# Patient Record
Sex: MALE | Race: WHITE | NOT HISPANIC OR LATINO | URBAN - METROPOLITAN AREA
[De-identification: names, ages, dates, MRNs, and addresses within clinical notes are randomized per-mention and may not be internally consistent; named-entity substitution may affect disease eponyms.]

---

## 2018-10-14 ENCOUNTER — INPATIENT (INPATIENT)
Facility: HOSPITAL | Age: 73
LOS: 1 days | Discharge: HOME | DRG: 312 | End: 2018-10-16
Attending: HOSPITALIST | Admitting: HOSPITALIST
Payer: MEDICARE

## 2018-10-14 VITALS
WEIGHT: 169.98 LBS | DIASTOLIC BLOOD PRESSURE: 88 MMHG | HEIGHT: 66 IN | SYSTOLIC BLOOD PRESSURE: 152 MMHG | TEMPERATURE: 101 F | OXYGEN SATURATION: 98 % | RESPIRATION RATE: 20 BRPM | HEART RATE: 86 BPM

## 2018-10-14 DIAGNOSIS — R55 SYNCOPE AND COLLAPSE: ICD-10-CM

## 2018-10-14 LAB
ALBUMIN SERPL ELPH-MCNC: 4.4 G/DL — SIGNIFICANT CHANGE UP (ref 3.3–5)
ALP SERPL-CCNC: 75 U/L — SIGNIFICANT CHANGE UP (ref 40–120)
ALT FLD-CCNC: 18 U/L — SIGNIFICANT CHANGE UP (ref 10–45)
ANION GAP SERPL CALC-SCNC: 13 MMOL/L — SIGNIFICANT CHANGE UP (ref 5–17)
APPEARANCE UR: CLEAR — SIGNIFICANT CHANGE UP
APTT BLD: 32.3 SEC — SIGNIFICANT CHANGE UP (ref 27.5–37.4)
AST SERPL-CCNC: 20 U/L — SIGNIFICANT CHANGE UP (ref 10–40)
BACTERIA # UR AUTO: NEGATIVE — SIGNIFICANT CHANGE UP
BASOPHILS # BLD AUTO: 0 K/UL — SIGNIFICANT CHANGE UP (ref 0–0.2)
BASOPHILS NFR BLD AUTO: 0 % — SIGNIFICANT CHANGE UP (ref 0–2)
BILIRUB SERPL-MCNC: 0.4 MG/DL — SIGNIFICANT CHANGE UP (ref 0.2–1.2)
BILIRUB UR-MCNC: NEGATIVE — SIGNIFICANT CHANGE UP
BUN SERPL-MCNC: 20 MG/DL — SIGNIFICANT CHANGE UP (ref 7–23)
CALCIUM SERPL-MCNC: 9.8 MG/DL — SIGNIFICANT CHANGE UP (ref 8.4–10.5)
CHLORIDE SERPL-SCNC: 101 MMOL/L — SIGNIFICANT CHANGE UP (ref 96–108)
CO2 SERPL-SCNC: 23 MMOL/L — SIGNIFICANT CHANGE UP (ref 22–31)
COLOR SPEC: COLORLESS — SIGNIFICANT CHANGE UP
CREAT SERPL-MCNC: 1.68 MG/DL — HIGH (ref 0.5–1.3)
DIFF PNL FLD: NEGATIVE — SIGNIFICANT CHANGE UP
EOSINOPHIL # BLD AUTO: 0.1 K/UL — SIGNIFICANT CHANGE UP (ref 0–0.5)
EOSINOPHIL NFR BLD AUTO: 1.2 % — SIGNIFICANT CHANGE UP (ref 0–6)
EPI CELLS # UR: 1 /HPF — SIGNIFICANT CHANGE UP
GAS PNL BLDV: SIGNIFICANT CHANGE UP
GLUCOSE SERPL-MCNC: 103 MG/DL — HIGH (ref 70–99)
GLUCOSE UR QL: NEGATIVE — SIGNIFICANT CHANGE UP
HCT VFR BLD CALC: 43.8 % — SIGNIFICANT CHANGE UP (ref 39–50)
HGB BLD-MCNC: 14.7 G/DL — SIGNIFICANT CHANGE UP (ref 13–17)
HYALINE CASTS # UR AUTO: 0 /LPF — SIGNIFICANT CHANGE UP (ref 0–2)
INR BLD: 0.98 RATIO — SIGNIFICANT CHANGE UP (ref 0.88–1.16)
KETONES UR-MCNC: NEGATIVE — SIGNIFICANT CHANGE UP
LACTATE BLDV-MCNC: 1 MMOL/L — SIGNIFICANT CHANGE UP (ref 0.7–2)
LEUKOCYTE ESTERASE UR-ACNC: NEGATIVE — SIGNIFICANT CHANGE UP
LYMPHOCYTES # BLD AUTO: 0.4 K/UL — LOW (ref 1–3.3)
LYMPHOCYTES # BLD AUTO: 5.8 % — LOW (ref 13–44)
MCHC RBC-ENTMCNC: 31.3 PG — SIGNIFICANT CHANGE UP (ref 27–34)
MCHC RBC-ENTMCNC: 33.6 GM/DL — SIGNIFICANT CHANGE UP (ref 32–36)
MCV RBC AUTO: 93.3 FL — SIGNIFICANT CHANGE UP (ref 80–100)
MONOCYTES # BLD AUTO: 0.8 K/UL — SIGNIFICANT CHANGE UP (ref 0–0.9)
MONOCYTES NFR BLD AUTO: 11.4 % — SIGNIFICANT CHANGE UP (ref 2–14)
NEUTROPHILS # BLD AUTO: 5.6 K/UL — SIGNIFICANT CHANGE UP (ref 1.8–7.4)
NEUTROPHILS NFR BLD AUTO: 81.6 % — HIGH (ref 43–77)
NITRITE UR-MCNC: NEGATIVE — SIGNIFICANT CHANGE UP
PH UR: 6.5 — SIGNIFICANT CHANGE UP (ref 5–8)
PLATELET # BLD AUTO: 196 K/UL — SIGNIFICANT CHANGE UP (ref 150–400)
POTASSIUM SERPL-MCNC: 4.3 MMOL/L — SIGNIFICANT CHANGE UP (ref 3.5–5.3)
POTASSIUM SERPL-SCNC: 4.3 MMOL/L — SIGNIFICANT CHANGE UP (ref 3.5–5.3)
PROT SERPL-MCNC: 7.1 G/DL — SIGNIFICANT CHANGE UP (ref 6–8.3)
PROT UR-MCNC: SIGNIFICANT CHANGE UP
PROTHROM AB SERPL-ACNC: 10.7 SEC — SIGNIFICANT CHANGE UP (ref 9.8–12.7)
RAPID RVP RESULT: DETECTED
RBC # BLD: 4.69 M/UL — SIGNIFICANT CHANGE UP (ref 4.2–5.8)
RBC # FLD: 12.3 % — SIGNIFICANT CHANGE UP (ref 10.3–14.5)
RBC CASTS # UR COMP ASSIST: 1 /HPF — SIGNIFICANT CHANGE UP (ref 0–4)
RV+EV RNA SPEC QL NAA+PROBE: DETECTED
SODIUM SERPL-SCNC: 137 MMOL/L — SIGNIFICANT CHANGE UP (ref 135–145)
SP GR SPEC: 1.01 — LOW (ref 1.01–1.02)
UROBILINOGEN FLD QL: NEGATIVE — SIGNIFICANT CHANGE UP
WBC # BLD: 6.8 K/UL — SIGNIFICANT CHANGE UP (ref 3.8–10.5)
WBC # FLD AUTO: 6.8 K/UL — SIGNIFICANT CHANGE UP (ref 3.8–10.5)
WBC UR QL: 2 /HPF — SIGNIFICANT CHANGE UP (ref 0–5)

## 2018-10-14 PROCEDURE — 70450 CT HEAD/BRAIN W/O DYE: CPT | Mod: 26

## 2018-10-14 PROCEDURE — 93010 ELECTROCARDIOGRAM REPORT: CPT

## 2018-10-14 PROCEDURE — 71046 X-RAY EXAM CHEST 2 VIEWS: CPT | Mod: 26

## 2018-10-14 PROCEDURE — 72125 CT NECK SPINE W/O DYE: CPT | Mod: 26

## 2018-10-14 PROCEDURE — 99285 EMERGENCY DEPT VISIT HI MDM: CPT | Mod: GC,25

## 2018-10-14 RX ORDER — ACETAMINOPHEN 500 MG
975 TABLET ORAL ONCE
Qty: 0 | Refills: 0 | Status: COMPLETED | OUTPATIENT
Start: 2018-10-14 | End: 2018-10-14

## 2018-10-14 RX ORDER — SODIUM CHLORIDE 9 MG/ML
1000 INJECTION INTRAMUSCULAR; INTRAVENOUS; SUBCUTANEOUS ONCE
Qty: 0 | Refills: 0 | Status: COMPLETED | OUTPATIENT
Start: 2018-10-14 | End: 2018-10-14

## 2018-10-14 RX ORDER — CEFTRIAXONE 500 MG/1
1 INJECTION, POWDER, FOR SOLUTION INTRAMUSCULAR; INTRAVENOUS ONCE
Qty: 0 | Refills: 0 | Status: COMPLETED | OUTPATIENT
Start: 2018-10-14 | End: 2018-10-14

## 2018-10-14 RX ADMIN — Medication 975 MILLIGRAM(S): at 20:50

## 2018-10-14 RX ADMIN — CEFTRIAXONE 100 GRAM(S): 500 INJECTION, POWDER, FOR SOLUTION INTRAMUSCULAR; INTRAVENOUS at 21:28

## 2018-10-14 RX ADMIN — SODIUM CHLORIDE 1000 MILLILITER(S): 9 INJECTION INTRAMUSCULAR; INTRAVENOUS; SUBCUTANEOUS at 20:24

## 2018-10-14 RX ADMIN — CEFTRIAXONE 1 GRAM(S): 500 INJECTION, POWDER, FOR SOLUTION INTRAMUSCULAR; INTRAVENOUS at 23:00

## 2018-10-14 RX ADMIN — SODIUM CHLORIDE 1000 MILLILITER(S): 9 INJECTION INTRAMUSCULAR; INTRAVENOUS; SUBCUTANEOUS at 21:41

## 2018-10-14 RX ADMIN — SODIUM CHLORIDE 1000 MILLILITER(S): 9 INJECTION INTRAMUSCULAR; INTRAVENOUS; SUBCUTANEOUS at 23:00

## 2018-10-14 RX ADMIN — Medication 975 MILLIGRAM(S): at 23:00

## 2018-10-14 NOTE — ED ADULT NURSE NOTE - CHPI ED NUR SYMPTOMS NEG
no numbness/no tingling/no weakness/no confusion/no fever/no bleeding/no deformity/no vomiting/no abrasion

## 2018-10-14 NOTE — ED ADULT NURSE NOTE - NSIMPLEMENTINTERV_GEN_ALL_ED
Implemented All Fall Risk Interventions:  [place] to call system. Call bell, personal items and telephone within reach. Instruct patient to call for assistance. Room bathroom lighting operational. Non-slip footwear when patient is off stretcher. Physically safe environment: no spills, clutter or unnecessary equipment. Stretcher in lowest position, wheels locked, appropriate side rails in place. Provide visual cue, wrist band, yellow gown, etc. Monitor gait and stability. Monitor for mental status changes and reorient to person, place, and time. Review medications for side effects contributing to fall risk. Reinforce activity limits and safety measures with patient and family.

## 2018-10-14 NOTE — ED PROVIDER NOTE - OBJECTIVE STATEMENT
74 yo M pmh htn presents s/p fall. Pt reports he fell at 5pm, was walking to the restroom, does not remember anything else. +head injury, +loc. Was on the ground between 15-30 minutes before family got to him. Pt reports he was too weak to get up. Endorses weakness and cough 1-2 days duration. Family members report he had an episode of urinary incontinence when he fell. Denies any other injuries. Denies any prodrome before the fall including cp, sob, headache, dizziness. No recent falls. Reports he is back to baseline now, has ambulated since the fall.

## 2018-10-14 NOTE — ED PROVIDER NOTE - PROGRESS NOTE DETAILS
Pt with orthostatic symptoms when getting up and going to the bathroom despite having been given antipyretics and fluids. Likely unsafe for DC. will admit.

## 2018-10-14 NOTE — ED PROVIDER NOTE - ATTENDING CONTRIBUTION TO CARE
ATTENDING MD:  Andrews HODGE, personally have seen and examined this patient.  I have discussed all aspects of care with the resident physician. Resident note reviewed and agree on plan of care and except where noted.  See HPI, PE, and MDM for details.    GEN: alert, nontoxic, A & O x 4  HEAD/EYES: NCAT, PERRL, EOMI, anicteric sclerae, no conjunctival pallor  ENT: mucus membranes moist, oropharynx WNL, trachea midline, no JVD  RESP: lungs CTA with equal breath sounds bilaterally, chest wall nontender and atraumatic  CV: heart with reg rhythm S1, S2, no murmur; distal pulses intact and symmetric bilaterally  GI: normoactive bowel sounds. the abdomen is soft, nondistended, nontender, there are no palpable masses  : no CVAT  MSK: extremities atraumatic and nontender, no edema, no asymmetry. the back is without midline or lateral tenderness, there is no spinal deformity or stepoff and the back is ranged painlessly. the neck has no midline tenderness, deformity, or stepoff, and is ranged painlessly.  SKIN: warm, dry, no rash, no bruising, no cyanosis. color appropriate for ethnicity  NEURO: alert, mentating appropriately, no facial asymmetry. gross sensation, motor, coordination are intact  PSYCH: Affect appropriate    MDM: febrile, no clear source, suspect viral syndrome. syncopal episode likely orthostatic/vasovagoal in nature but having recurrent symptoms with micturation after sluid hydration, likely will continue in setting of fever as pt not responding to treatment, unsafe discharge at this time, will admit.

## 2018-10-14 NOTE — ED ADULT TRIAGE NOTE - CHIEF COMPLAINT QUOTE
slipped on wood floor hit head pt alert on arrival states mild head pain and states legs went out from him recently not feeling well had sinus infection

## 2018-10-14 NOTE — ED PROVIDER NOTE - MEDICAL DECISION MAKING DETAILS
Syncope work-up, infectious work up as pt is febrile here with respiratory complaints. Neuro intact, non focal exam, well appearing. Basic labs, ekg, ct head/cspine, reassess  Sarah Chauhan, PGY-2 EM

## 2018-10-14 NOTE — ED ADULT NURSE NOTE - OBJECTIVE STATEMENT
jpt has syncopal episode unknown how long down few minutes pt was jammed behind the door at home unable to get himself up Right away Pt has had some kendrick al congestion past few days hx of hypertension only IVL placed and fs 108 bloods drawn ans pending md for evaluation Goran jpt has syncopal episode unknown how long down few minutes pt was jammed behind the door at home unable to get himself up Right away Pt has had some kendrick al congestion past few days hx of hypertension only IVL placed and fs 108 bloods drawn ans pending md for evaluation  Wife stated he loss control of his bladder also  Mpuleorn

## 2018-10-15 DIAGNOSIS — Z29.9 ENCOUNTER FOR PROPHYLACTIC MEASURES, UNSPECIFIED: ICD-10-CM

## 2018-10-15 DIAGNOSIS — B34.8 OTHER VIRAL INFECTIONS OF UNSPECIFIED SITE: ICD-10-CM

## 2018-10-15 DIAGNOSIS — Z79.899 OTHER LONG TERM (CURRENT) DRUG THERAPY: ICD-10-CM

## 2018-10-15 DIAGNOSIS — R55 SYNCOPE AND COLLAPSE: ICD-10-CM

## 2018-10-15 DIAGNOSIS — M19.90 UNSPECIFIED OSTEOARTHRITIS, UNSPECIFIED SITE: ICD-10-CM

## 2018-10-15 DIAGNOSIS — N17.9 ACUTE KIDNEY FAILURE, UNSPECIFIED: ICD-10-CM

## 2018-10-15 DIAGNOSIS — I10 ESSENTIAL (PRIMARY) HYPERTENSION: ICD-10-CM

## 2018-10-15 DIAGNOSIS — G93.9 DISORDER OF BRAIN, UNSPECIFIED: ICD-10-CM

## 2018-10-15 DIAGNOSIS — D64.9 ANEMIA, UNSPECIFIED: ICD-10-CM

## 2018-10-15 DIAGNOSIS — Z90.79 ACQUIRED ABSENCE OF OTHER GENITAL ORGAN(S): Chronic | ICD-10-CM

## 2018-10-15 DIAGNOSIS — N40.0 BENIGN PROSTATIC HYPERPLASIA WITHOUT LOWER URINARY TRACT SYMPTOMS: ICD-10-CM

## 2018-10-15 DIAGNOSIS — R79.89 OTHER SPECIFIED ABNORMAL FINDINGS OF BLOOD CHEMISTRY: ICD-10-CM

## 2018-10-15 LAB
ANION GAP SERPL CALC-SCNC: 10 MMOL/L — SIGNIFICANT CHANGE UP (ref 5–17)
BASOPHILS # BLD AUTO: 0 K/UL — SIGNIFICANT CHANGE UP (ref 0–0.2)
BASOPHILS NFR BLD AUTO: 0 % — SIGNIFICANT CHANGE UP (ref 0–2)
BUN SERPL-MCNC: 15 MG/DL — SIGNIFICANT CHANGE UP (ref 7–23)
CALCIUM SERPL-MCNC: 9 MG/DL — SIGNIFICANT CHANGE UP (ref 8.4–10.5)
CHLORIDE SERPL-SCNC: 106 MMOL/L — SIGNIFICANT CHANGE UP (ref 96–108)
CO2 SERPL-SCNC: 23 MMOL/L — SIGNIFICANT CHANGE UP (ref 22–31)
CREAT SERPL-MCNC: 1.68 MG/DL — HIGH (ref 0.5–1.3)
EOSINOPHIL # BLD AUTO: 0 K/UL — SIGNIFICANT CHANGE UP (ref 0–0.5)
EOSINOPHIL NFR BLD AUTO: 0 % — SIGNIFICANT CHANGE UP (ref 0–6)
GLUCOSE SERPL-MCNC: 108 MG/DL — HIGH (ref 70–99)
HCT VFR BLD CALC: 38.2 % — LOW (ref 39–50)
HGB BLD-MCNC: 12.6 G/DL — LOW (ref 13–17)
LACTATE BLDV-MCNC: 1.6 MMOL/L — SIGNIFICANT CHANGE UP (ref 0.7–2)
LYMPHOCYTES # BLD AUTO: 0.79 K/UL — LOW (ref 1–3.3)
LYMPHOCYTES # BLD AUTO: 14.9 % — SIGNIFICANT CHANGE UP (ref 13–44)
MAGNESIUM SERPL-MCNC: 2.1 MG/DL — SIGNIFICANT CHANGE UP (ref 1.6–2.6)
MCHC RBC-ENTMCNC: 31 PG — SIGNIFICANT CHANGE UP (ref 27–34)
MCHC RBC-ENTMCNC: 33 GM/DL — SIGNIFICANT CHANGE UP (ref 32–36)
MCV RBC AUTO: 94.1 FL — SIGNIFICANT CHANGE UP (ref 80–100)
MONOCYTES # BLD AUTO: 0.46 K/UL — SIGNIFICANT CHANGE UP (ref 0–0.9)
MONOCYTES NFR BLD AUTO: 8.8 % — SIGNIFICANT CHANGE UP (ref 2–14)
NEUTROPHILS # BLD AUTO: 4.03 K/UL — SIGNIFICANT CHANGE UP (ref 1.8–7.4)
NEUTROPHILS NFR BLD AUTO: 76.3 % — SIGNIFICANT CHANGE UP (ref 43–77)
PHOSPHATE SERPL-MCNC: 2.4 MG/DL — LOW (ref 2.5–4.5)
PLATELET # BLD AUTO: 167 K/UL — SIGNIFICANT CHANGE UP (ref 150–400)
POTASSIUM SERPL-MCNC: 4 MMOL/L — SIGNIFICANT CHANGE UP (ref 3.5–5.3)
POTASSIUM SERPL-SCNC: 4 MMOL/L — SIGNIFICANT CHANGE UP (ref 3.5–5.3)
RBC # BLD: 4.06 M/UL — LOW (ref 4.2–5.8)
RBC # FLD: 13.3 % — SIGNIFICANT CHANGE UP (ref 10.3–14.5)
SODIUM SERPL-SCNC: 139 MMOL/L — SIGNIFICANT CHANGE UP (ref 135–145)
WBC # BLD: 5.28 K/UL — SIGNIFICANT CHANGE UP (ref 3.8–10.5)
WBC # FLD AUTO: 5.28 K/UL — SIGNIFICANT CHANGE UP (ref 3.8–10.5)

## 2018-10-15 PROCEDURE — 93010 ELECTROCARDIOGRAM REPORT: CPT

## 2018-10-15 PROCEDURE — 12345: CPT | Mod: NC

## 2018-10-15 PROCEDURE — 70553 MRI BRAIN STEM W/O & W/DYE: CPT | Mod: 26

## 2018-10-15 PROCEDURE — 99223 1ST HOSP IP/OBS HIGH 75: CPT | Mod: AI,GC

## 2018-10-15 RX ORDER — ACETAMINOPHEN 500 MG
650 TABLET ORAL EVERY 6 HOURS
Qty: 0 | Refills: 0 | Status: DISCONTINUED | OUTPATIENT
Start: 2018-10-15 | End: 2018-10-16

## 2018-10-15 RX ORDER — SODIUM CHLORIDE 9 MG/ML
1000 INJECTION INTRAMUSCULAR; INTRAVENOUS; SUBCUTANEOUS
Qty: 0 | Refills: 0 | Status: DISCONTINUED | OUTPATIENT
Start: 2018-10-15 | End: 2018-10-16

## 2018-10-15 RX ORDER — ENOXAPARIN SODIUM 100 MG/ML
40 INJECTION SUBCUTANEOUS EVERY 24 HOURS
Qty: 0 | Refills: 0 | Status: DISCONTINUED | OUTPATIENT
Start: 2018-10-15 | End: 2018-10-16

## 2018-10-15 RX ORDER — TRAMADOL HYDROCHLORIDE 50 MG/1
50 TABLET ORAL EVERY 6 HOURS
Qty: 0 | Refills: 0 | Status: DISCONTINUED | OUTPATIENT
Start: 2018-10-15 | End: 2018-10-16

## 2018-10-15 RX ORDER — TRAMADOL HYDROCHLORIDE 50 MG/1
2 TABLET ORAL
Qty: 0 | Refills: 0 | COMMUNITY

## 2018-10-15 RX ORDER — AMLODIPINE BESYLATE 2.5 MG/1
1 TABLET ORAL
Qty: 0 | Refills: 0 | COMMUNITY

## 2018-10-15 RX ORDER — AMLODIPINE BESYLATE 2.5 MG/1
10 TABLET ORAL DAILY
Qty: 0 | Refills: 0 | Status: DISCONTINUED | OUTPATIENT
Start: 2018-10-15 | End: 2018-10-16

## 2018-10-15 RX ADMIN — SODIUM CHLORIDE 75 MILLILITER(S): 9 INJECTION INTRAMUSCULAR; INTRAVENOUS; SUBCUTANEOUS at 10:23

## 2018-10-15 RX ADMIN — TRAMADOL HYDROCHLORIDE 50 MILLIGRAM(S): 50 TABLET ORAL at 11:26

## 2018-10-15 RX ADMIN — ENOXAPARIN SODIUM 40 MILLIGRAM(S): 100 INJECTION SUBCUTANEOUS at 11:04

## 2018-10-15 RX ADMIN — AMLODIPINE BESYLATE 10 MILLIGRAM(S): 2.5 TABLET ORAL at 05:38

## 2018-10-15 RX ADMIN — TRAMADOL HYDROCHLORIDE 50 MILLIGRAM(S): 50 TABLET ORAL at 11:40

## 2018-10-15 NOTE — ED ADULT NURSE REASSESSMENT NOTE - NS ED NURSE REASSESS COMMENT FT1
Patient resting in stretcher and updated on plan of care.  Patient denies; chest pain, shortness of breath, dizziness.  Bed in lowest position.

## 2018-10-15 NOTE — H&P ADULT - PROBLEM SELECTOR PLAN 1
- Syncope after attempting to stand up from bed with LOC and urinary incontinence with no prodromal symptoms  - Differential include: orthostatic hypotension vs cardiac vs vasovagal vs neurologic  - For orthostatic hypotension, possible in the setting of dehydration from viral infection, although orthostatics performed in the ED does not reveal >20 mmHg drop in SBP from sitting to standing position  - For cardiac, patient with normal EKG, no history of cardiac disease, will keep on telemetry for now  - For vasovagal, less likely as patient has no prodromes and syncope not associated with specific activity   - For seizure, no reported postictal state, no tongue biting  - Continue with telemetry monitoring for now, continue with IVF for hydration - Syncope after attempting to stand up from bed with LOC and urinary incontinence with no prodromal symptoms  - Differential include: orthostatic hypotension vs cardiac vs vasovagal vs neurologic  - For orthostatic hypotension, possible in the setting of dehydration from viral infection, although orthostatics performed in the ED does not reveal >20 mmHg drop in SBP from sitting to standing position, but HR increased >15  - For cardiac, patient with normal EKG, no history of cardiac disease, will monitor for arrhythmia on telemetry and will check TTE to eval for structural heart dz  - For vasovagal, less likely as patient has no prodromes and syncope not associated with specific activity   - For seizure, no reported postictal state, no tongue biting, but CT head with 7mm lesion, will check MRI to further eval

## 2018-10-15 NOTE — ED ADULT NURSE REASSESSMENT NOTE - NS ED NURSE REASSESS COMMENT FT1
Patient resting comfortably at this time, awaiting bed assignment on tele unit. Denies c/o pain or discomfort. Comfort and safety measures maintained.

## 2018-10-15 NOTE — PROGRESS NOTE ADULT - PROBLEM SELECTOR PLAN 9
- DVT prophylaxis: IMPROVE score 1 (age>60), Lovenox  - GI prophylaxis: not indicated  - Diet: DASH  - Dispo: PT pending

## 2018-10-15 NOTE — DISCHARGE NOTE ADULT - PROVIDER TOKENS
FREE:[LAST:[Mathews],FIRST:[Tima],PHONE:[(   )    -],FAX:[(   )    -],ADDRESS:[Primary Care Provider in [place]]]

## 2018-10-15 NOTE — H&P ADULT - HISTORY OF PRESENT ILLNESS
History is obtained from the patient, no family at the bedside for corroboration of the story, no answer at phone number listed in the chart.     Patient is a 73 year old man with history of HTN, ostearthritis of back, BPH s/p TURP presented with syncope. He reports that he was trying to get up from bed to the go the bathroom this afternoon and fell. He had loss of consciousness for about 10-15 minutes before family found him and brought him to the ED. He did have urinary incontinence, no jerking movements, no tongue biting. Patient denies chest pain, or palpitations. He denies vertigo. He does endorse feeling week for the past 2-3 days. He has been having a cough, nasal congestions, sore throat for the past 2-3 days. Denies sick contact, but him and his wife are arrived from [place] via train, currently visiting their daughter. While trying to collect urine samples in the bathroom in the ED, patient had another episode of fall, stating that he tripped, no loss of consciousness this time and his wife was on his side to hold him. He denies abdominal pain, nausea or vomiting, diarrhea or constipation, does report feeling dehydrated. He denies past syncopal episodes. He does endorse subjective fever and chills.     In the ED, initial vitals were Temp 101.5, /88, HR 86, RR 20, O2 saturation 98% on room air. He was given Tylenol 975 mg, 1x Ceftriaxone, 2 L NS bolus in the ED. History is obtained from the patient, no family at the bedside for corroboration of the story, nobody is answering at home phone number.     Patient is a 73 year old man with history of HTN, ostearthritis of back, BPH s/p TURP presented with syncope. He reports that he was trying to get up from bed to the go the bathroom this afternoon and fell. He had loss of consciousness for about 5 minutes before family found him and brought him to the ED. He did have urinary incontinence, no jerking movements, no tongue biting. Patient denies chest pain, or palpitations. He denies vertigo. He does endorse feeling week for the past 2-3 days. He has been having a cough, nasal congestions, sore throat for the past 2-3 days. Denies sick contact, but him and his wife arrived from [place] via train, currently visiting their daughter. While trying to collect urine sample in the bathroom in the ED, patient had another witnessed episode of fall, stating that he tripped, no loss of consciousness this time and his wife was on his side to hold him. He denies abdominal pain, nausea or vomiting, diarrhea or constipation, does report feeling dehydrated. He denies past syncopal episodes. He does endorse subjective fever and chills.     In the ED, initial vitals were Temp 101.5, /88, HR 86, RR 20, O2 saturation 98% on room air. He was given Tylenol 975 mg, 1x Ceftriaxone, 2 L NS bolus in the ED. History is obtained from the patient, no family at the bedside for corroboration of the story, nobody is answering at home phone number.     Patient is a 73 year old man with history of HTN, ostearthritis of back, BPH s/p TURP presented with syncope. He reports that he was trying to get up from bed to the go the bathroom this afternoon and fell. He had loss of consciousness for about 5 minutes before family found him and brought him to the ED. He did have urinary incontinence, no jerking movements, no tongue biting. Patient denies chest pain, or palpitations. He denies vertigo. He does endorse feeling week for the past 2-3 days. He has been having a nonproductive cough, nasal congestions, sore throat for the past 2-3 days. Denies sick contact, but him and his wife arrived from [place] via train, currently visiting their daughter. While trying to collect urine sample in the bathroom in the ED, patient had another witnessed episode of fall, stating that he tripped, no loss of consciousness this time and his wife was on his side to hold him. He denies abdominal pain, nausea or vomiting, diarrhea or constipation, does report feeling dehydrated. He denies past syncopal episodes. He does endorse subjective fever and chills.     In the ED, initial vitals were Temp 101.5, /88, HR 86, RR 20, O2 saturation 98% on room air. He was given Tylenol 975 mg, 1x Ceftriaxone, 2 L NS bolus in the ED. History is obtained from the patient, no family at the bedside for corroboration of the story, nobody is answering at home phone number.     Patient is a 73 year old man with history of HTN, osteoarthritis of back, BPH s/p TURP presented with syncope. He reports that he was trying to get up from bed to the go the bathroom this afternoon and fell. He had loss of consciousness for about 5 minutes before family found him and brought him to the ED. He did have urinary incontinence, no jerking movements, no tongue biting. Patient denies chest pain, or palpitations. He denies vertigo. He does endorse feeling week for the past 2-3 days. He has been having a nonproductive cough, nasal congestions, sore throat for the past 2-3 days. Denies sick contact, but him and his wife arrived from [place] via train, currently visiting their daughter. While trying to collect urine sample in the bathroom in the ED, patient had another witnessed episode of fall, stating that he tripped, no loss of consciousness this time and his wife was on his side to hold him. He denies abdominal pain, nausea or vomiting, diarrhea or constipation, does report feeling dehydrated. He denies past syncopal episodes. He does endorse subjective fever and chills.     In the ED, initial vitals were Temp 101.5, /88, HR 86, RR 20, O2 saturation 98% on room air. He was given Tylenol 975 mg, 1x Ceftriaxone, 2 L NS bolus in the ED.

## 2018-10-15 NOTE — PHYSICAL THERAPY INITIAL EVALUATION ADULT - ADDITIONAL COMMENTS
Pt lives at home w/ wife. +4 steps to enter and 1 flt to bedroom. PTA indep w/ all ADLs w/o an assist device.

## 2018-10-15 NOTE — PROGRESS NOTE ADULT - ASSESSMENT
Mr. Prabhakar is an entirely independent 74 yo man from [facility] w/ hx HTN, OA of back, BPH s/p TURP presented with syncope, URI and fever admitted for syncope evaluation, dehydration and entero/rhinovirus infection.     Syncope occurred w/ virus c/b decreased PO intake x many days, +alcohol and fiorocet w/ cumulative diuretic effect, and in context of laying down to standing up per patient report: this is entirely c/w dehydration + orthostatic hypotension in setting of illness. No CP, non ischemic ekg, neg trop on admission w/ no hx of preceding palpitaitons and no cardiac hx, no murmur or clinical e/o HF: there is no indication for inpatient echo at this time per ACC/AHA 2017 guidelines on evaluation of patients w/ syncope. Will monitor x 24 hrs on telemetry.     Discussed CNS lesion w/ Dr. Sen from neuroradiology, GFR 40 ok for contrast to evaluate likely cavernoma.   Set for today.   Hold on echo as above.   Hydration.  Monitor on tele x 24 hrs.    Repeat orthostatics.  Likely d/c early tmrw if no events on tele.

## 2018-10-15 NOTE — H&P ADULT - PROBLEM SELECTOR PLAN 4
BP elevated at 150-160/70-90  - Patient does not remember his home medications   - Continue with Amlodipine 10 mg with holding parameters for now - Lactate elevated at 2.5, could be in the setting of viral infection  - Continue to trend lactate after IVF given

## 2018-10-15 NOTE — DISCHARGE NOTE ADULT - PATIENT PORTAL LINK FT
You can access the [facility] Patient Portal, offered by [facility], by registering with the following website: [email or web address]

## 2018-10-15 NOTE — PHYSICAL THERAPY INITIAL EVALUATION ADULT - PERTINENT HX OF CURRENT PROBLEM, REHAB EVAL
73 year old man with history of HTN, osteoarthritis of back, BPH s/p TURP presented with syncope. He had loss of consciousness for about 5 minutes before family found him and brought him to the ED.; CTH = Incidental note of a 7 mm hyperdense focus in the left felicity. Pt a/w syncope, URI and fever admitted for syncope evaluation, dehydration and entero/rhinovirus infection.

## 2018-10-15 NOTE — H&P ADULT - NSHPREVIEWOFSYSTEMS_GEN_ALL_CORE
Constitutional: Positive for fever and chills. Actively trying to lose weight (8 pounds in the past month)  HEENT: No dry eyes or eye irritation. Nasal congestion.   CV: No chest pain, or palpitations. No orthopnea.   Resp: Cough without sputum production. No shortness of breath or dyspnea on exertion.   GI: No nausea or vomiting. No diarrhea or constipation. No abdominal pain.   : No dysuria, no nocturia or increased urinary frequency.  Musculoskeletal: Chronic back pain, no myalgias  Skin: No rash or itchiness.   Neurological: No headache or dizziness. Syncope, generalized weakness.   Psychiatric: Denies depressed mood.

## 2018-10-15 NOTE — H&P ADULT - PROBLEM SELECTOR PLAN 9
- DVT prophylaxis: IMPROVE score 1 (age>60), Lovenox  - GI prophylaxis: not indicated  - Diet: NICO Booker PGY-2  Internal medicine night admit  Pager [phone] - DVT prophylaxis: IMPROVE score 1 (age>60), Lovenox  - GI prophylaxis: not indicated  - Diet: DASH  - Dispo: PT pending    Irasema Booker PGY-2  Internal medicine night admit  Pager [phone]

## 2018-10-15 NOTE — PROGRESS NOTE ADULT - PROBLEM SELECTOR PLAN 5
- hgb 12 from 14, no clinical bleeding at all, no hemodynamic evidence of bleed, likely hemodilutional from hydration.

## 2018-10-15 NOTE — H&P ADULT - NSHPPHYSICALEXAM_GEN_ALL_CORE
General: Alert and cooperative. Not in acute stress. Well developed, well nourished.   Head: Normocephalic   Eyes:  PERRLA, clear conjunctiva. EOMI, no ptosis.   Throat: Oral cavity and pharynx normal. No inflammation, swelling, exudate, or lesions.    Neck: No lymphadenopathy   Respiratory: Bilateral lung clear to auscultation, no crackles, no wheezes, no rhonchi.   Cardiovascular: S1/S2 auscultated, no murmur, or gallop. Rhythm is regular. There is no peripheral edema   Abdomen: Soft, non-tender, nondistended, no guarding or rebound tenderness. Active bowel sounds in all 4 quadrants.     Extremities: No significant deformity or joint abnormality. Peripheral pulses intact.  No peripheral edema   Skin: Intact, no rash   Neurological: AOAx4. CN2-12 grosslly intact. Strength and sensation symmetric and intact throughout.

## 2018-10-15 NOTE — H&P ADULT - PROBLEM SELECTOR PLAN 7
- DVT prophylaxis: IMPROVE score 1 (age>60), Lovenox  - GI prophylaxis: not indicated  - Diet: NICO Booker PGY-2  Internal medicine night admit  Pager [phone] - Patient taking Tramadol 50 mg Q4H for chronic back pain  - ISTOP #: [id number]  - Continue with Tramadol 50 mg Q6H for severe pain s/p TURP  - UA negative for UTI  - Continue to monitor urinary symptoms

## 2018-10-15 NOTE — DISCHARGE NOTE ADULT - MEDICATION SUMMARY - MEDICATIONS TO TAKE
I will START or STAY ON the medications listed below when I get home from the hospital:    traMADol 50 mg oral tablet  -- 2 tab(s) by mouth 4 times a day, As Needed  -- Indication: For Pain    Fioricet oral tablet  -- 1 tab(s) by mouth , As Needed  -- Indication: For Pain    Norvasc 10 mg oral tablet  -- 1 tab(s) by mouth once a day  -- Indication: For High blood pressure I will START or STAY ON the medications listed below when I get home from the hospital:    traMADol 50 mg oral tablet  -- 2 tab(s) by mouth 4 times a day, As Needed  -- Indication: For Pain    Norvasc 10 mg oral tablet  -- 1 tab(s) by mouth once a day  -- Indication: For High blood pressure

## 2018-10-15 NOTE — PROGRESS NOTE ADULT - PROBLEM SELECTOR PLAN 4
- Cr elevated at 1.68 but unknown baseline, unclear whether there is a CKD component  - per wife this is close to baseline, PCP recently removed mobic (NSAID) from his list with this concern.  - stabilized, no acute lyte abnormalities.

## 2018-10-15 NOTE — PROGRESS NOTE ADULT - PROBLEM SELECTOR PLAN 1
- dehydration + orthostasis as above given hx.  - hydration, already feels improved.   - repeat orthostatics today.   - ambulate with nurse around unit.   - discharge dependent PT kaila, entirely independent at home. Suspect he will have no needs after hydration.

## 2018-10-15 NOTE — H&P ADULT - NSHPSOCIALHISTORY_GEN_ALL_CORE
- Lives at home with wife, now visiting daughter  - Remote 20 pack year smoking history (quit 40 years ago), drinks 2 cups of scotch daily, last drink 2 days ago

## 2018-10-15 NOTE — H&P ADULT - PROBLEM SELECTOR PLAN 6
- Patient taking Tramadol 50 mg Q4H for chronic back pain  - ISTOP #: [id number]  - Continue with Tramadol 50 mg Q6H for severe pain s/p TURP  - UA negative for UTI  - Continue to monitor urinary symptoms BP elevated at 150-160/70-90  - Patient does not remember his home medications   - Continue with Amlodipine 10 mg with holding parameters for now

## 2018-10-15 NOTE — DISCHARGE NOTE ADULT - CONDITIONS AT DISCHARGE
Pt verbalized understanding of discharge instructions. Pt alert and oriented x4, ambulatory, voiding, tolerating diet, vss. Pt verbalized understanding of medications prescribed and to follow up with MD in time ordered.  IVL removed. Safety maintained.

## 2018-10-15 NOTE — H&P ADULT - PROBLEM SELECTOR PLAN 2
- Patient with fever, but does not meet other SIRS criteria  - Continue with antitussives and antipyretics for supportive measure  - Continue with IVF - Patient with fever, but does not meet other SIRS criteria, CXR no infiltrate  - Will treat with antitussives and antipyretics for supportive measure  - s/p Ceftriaxone x1 in ED, will observe off Abx and treat with IVF

## 2018-10-15 NOTE — PROGRESS NOTE ADULT - SUBJECTIVE AND OBJECTIVE BOX
cc: syncope    Overnight no acute events, rested comfortably.   Tele 60-70 NSR.     REVIEW OF SYSTEMS:  CONSTITUTIONAL: +recent viral prodrome, + fatigue, residual cough.   EYES: No eye pain, visual disturbances, or discharge  ENMT:  No difficulty hearing, tinnitus, vertigo; No sinus or throat pain  NECK: +chronic neck pain   RESPIRATORY: No cough, wheezing, chills or hemoptysis; No shortness of breath  CARDIOVASCULAR: No chest pain, palpitations, dizziness, or leg swelling.  GASTROINTESTINAL: No abdominal or epigastric pain. No nausea, vomiting, or hematemesis; No diarrhea or constipation. No melena or hematochezia.  GENITOURINARY: No dysuria, frequency, hematuria, or incontinence  NEUROLOGICAL: No headaches, memory loss, loss of strength, numbness, or tremors  SKIN: No itching, burning, rashes, or lesions   ENDOCRINE: No heat or cold intolerance; No hair loss  MUSCULOSKELETAL: No joint pain or swelling; No muscle, back, or extremity pain  PSYCHIATRIC: No depression, anxiety, mood swings, or difficulty sleeping  HEME/LYMPH: No easy bruising, or bleeding gums    T(C): 37.3 (10-15-18 @ 10:19), Max: 38.6 (10-14-18 @ 18:08)  HR: 75 (10-15-18 @ 10:19) (73 - 88)  BP: 140/74 (10-15-18 @ 10:19) (127/68 - 152/88)  RR: 17 (10-15-18 @ 10:19) (16 - 20)  SpO2: 98% (10-15-18 @ 10:19) (95% - 98%)  Wt(kg): --Vital Signs Last 24 Hrs  T(C): 37.3 (15 Oct 2018 10:19), Max: 38.6 (14 Oct 2018 18:08)  T(F): 99.1 (15 Oct 2018 10:19), Max: 101.5 (14 Oct 2018 18:08)  HR: 75 (15 Oct 2018 10:19) (73 - 88)  BP: 140/74 (15 Oct 2018 10:19) (127/68 - 152/88)  BP(mean): --  RR: 17 (15 Oct 2018 10:19) (16 - 20)  SpO2: 98% (15 Oct 2018 10:19) (95% - 98%)    PHYSICAL EXAM:  GENERAL: NAD, well-groomed. Glasses on. Wife by bedside.   HEAD:  Atraumatic, Normocephalic  EYES: EOMI, conjunctiva and sclera clear  NECK: Supple, No JVD, + paracervical muscle spasm on L. No carotid bruits.   NERVOUS SYSTEM:  Alert & Oriented X3, Good concentration; Motor Strength 5/5 B/L upper and lower extremities  CHEST/LUNG: Clear to percussion bilaterally; No rales, rhonchi, wheezing  HEART: Regular rate and rhythm; NO murmur heard.   ABDOMEN: Soft, Nontender, Nondistended; Bowel sounds present  EXTREMITIES:  2+ Peripheral Pulses, No clubbing, cyanosis, or edema  SKIN: No rashes or lesions    Consultant(s) Notes Reviewed:  [x ] YES  [ ] NO    LABS:  no leukoctosis, hgb own 12 from 14, all cell lines down after fluids w/ no clinical bleeding.   Cr 1.68                        12.6   5.28  )-----------( 167      ( 15 Oct 2018 07:46 )             38.2     10-15    139  |  106  |  15  ----------------------------<  108<H>  4.0   |  23  |  1.68<H>    Ca    9.0      15 Oct 2018 06:37  Phos  2.4     10-15  Mg     2.1     10-15    TPro  7.1  /  Alb  4.4  /  TBili  0.4  /  DBili  x   /  AST  20  /  ALT  18  /  AlkPhos  75  10-14    PT/INR - ( 14 Oct 2018 19:45 )   PT: 10.7 sec;   INR: 0.98 ratio         PTT - ( 14 Oct 2018 19:45 )  PTT:32.3 sec  Urinalysis Basic - ( 14 Oct 2018 23:17 )    Color: Colorless / Appearance: Clear / S.008 / pH: x  Gluc: x / Ketone: Negative  / Bili: Negative / Urobili: Negative   Blood: x / Protein: Trace / Nitrite: Negative   Leuk Esterase: Negative / RBC: 1 /hpf / WBC 2 /hpf   Sq Epi: x / Non Sq Epi: 1 /hpf / Bacteria: Negative    CAPILLARY BLOOD GLUCOSE    POCT Blood Glucose.: 108 mg/dL (14 Oct 2018 18:35)    RADIOLOGY & ADDITIONAL TESTS:  Imaging Personally Reviewed: yes  Care discussed w/ other providers: yes--  Consultant notes reviewed: yes    CTH:     HEAD:    There is no CT evidence of acute intracranial hemorrhage, extra-axial   collection, midline shift, central herniation, hydrocephalus or acute   territorial infarct.     There is a 7 mm round hyperdense focus in the left aspect of the felicity   with suggestion of mild surrounding edema. No mass effect upon the   adjacent fourth ventricle.    Mild periventricular and subcortical white matter hypoattenuation without   mass effect is noted, non-specific, but likely related to chronic small   vessel ischemic changes.     Cerebral volume loss is present with proportional prominence of the sulci   and ventricles.     The visualized paranasal sinuses are clear.    The mastoid air cells and middle ear cavities are grossly clear.    The calvarium is intact.    CERVICAL SPINE:    There is no evidence for acute fracture, subluxation, or prevertebral   widening. The craniocervical junction is unremarkable.    There are multilevel degenerative changes characterized by disc   osteophyte complexes and facet and uncinate hypertrophy. This results in   mild multilevel spinal canal stenosis and multilevel neural foraminal   narrowing.     The lung apices are clear. The surrounding soft tissues are unremarkable.    IMPRESSION:    Head CT: No evidence for intracranial hemorrhage or displaced calvarial   fracture. Incidental note of a 7 mm hyperdense focus in the left felicity.   Differential includes cavernous malformation versus metastasis.   Contrast-enhanced brain MRI is recommended for further evaluation if   there are no prior studies for comparison.    Cervical spine CT: No evidence for acute displaced fracture or traumatic   malalignment. Cervical degenerative spondylosis, as described above. MRI   can be performed if there is concern for ligamentous or cord injury, and   if there are no MRI contraindications.

## 2018-10-15 NOTE — PROGRESS NOTE ADULT - PROBLEM SELECTOR PLAN 8
- Patient taking Tramadol 50 mg Q4H for chronic back pain  - ISTOP #: [id number]  - Continue with Tramadol 50 mg Q6H for severe pain, GFR > 30 no dosing changes necessary.

## 2018-10-15 NOTE — PROGRESS NOTE ADULT - PROBLEM SELECTOR PLAN 7
140 systolics today.   - Continue with Amlodipine 10 mg with holding parameters for now  - heating pad for cervical muscle spasm for pain.

## 2018-10-15 NOTE — H&P ADULT - ASSESSMENT
Patient is a 73 year old man with history of HTN, ostearthritis of back, BPH s/p TURP presented with syncope found to be positive for entero/rhinovirus. Patient is a 73 year old man with history of HTN, osteoarthritis of back, BPH s/p TURP presented with syncope found to be positive for entero/rhinovirus. Patient is a 73 year old man with history of HTN, osteoarthritis of back, BPH s/p TURP presented with syncope, URI and fever admitted for syncope evaluation, dehydration and entero/rhinovirus infection.

## 2018-10-15 NOTE — PROGRESS NOTE ADULT - PROBLEM SELECTOR PLAN 3
- Found to have 7 mm hyperdense focus at L felicity with mild edema, no relation to syncope.   - MRI brain ordered for further evaluation, per neuroradiology GFR ok for contrast.  - d/w patient.

## 2018-10-15 NOTE — PHYSICAL THERAPY INITIAL EVALUATION ADULT - DID THE PATIENT HAVE SURGERY?
n/a/a/w syncope, URI and fever admitted for syncope evaluation, dehydration and entero/rhinovirus infection.

## 2018-10-15 NOTE — H&P ADULT - NSHPLABSRESULTS_GEN_ALL_CORE
LABS:                        14.7   6.8   )-----------( 196      ( 14 Oct 2018 19:45 )             43.8     Hgb Trend: 14.7<--  10-14    137  |  101  |  20  ----------------------------<  103<H>  4.3   |  23  |  1.68<H>    Ca    9.8      14 Oct 2018 19:45    TPro  7.1  /  Alb  4.4  /  TBili  0.4  /  DBili  x   /  AST  20  /  ALT  18  /  AlkPhos  75  10-14    Creatinine Trend: 1.68<--  PT/INR - ( 14 Oct 2018 19:45 )   PT: 10.7 sec;   INR: 0.98 ratio         PTT - ( 14 Oct 2018 19:45 )  PTT:32.3 sec  Urinalysis Basic - ( 14 Oct 2018 23:17 )    Color: Colorless / Appearance: Clear / S.008 / pH: x  Gluc: x / Ketone: Negative  / Bili: Negative / Urobili: Negative   Blood: x / Protein: Trace / Nitrite: Negative   Leuk Esterase: Negative / RBC: 1 /hpf / WBC 2 /hpf   Sq Epi: x / Non Sq Epi: 1 /hpf / Bacteria: Negative        Venous Blood Gas:  10-14 @ 23:17  --/--/--/--/--  VBG Lactate: 1.0  Venous Blood Gas:  10-14 @ 19:45  7.35/48/21/26/30  VBG Lactate: 2.5    < from: CT Head No Cont (10.14.18 @ 20:28) >    FINDINGS:    HEAD:    There is no CT evidence of acute intracranial hemorrhage, extra-axial   collection, midline shift, central herniation, hydrocephalus or acute   territorial infarct.     There is a 7 mm round hyperdense focus in the left aspect of the felicity   with suggestion of mild surrounding edema. No mass effect upon the   adjacent fourth ventricle.    Mild periventricular and subcortical white matter hypoattenuation without   mass effect is noted, non-specific, but likely related to chronic small   vessel ischemic changes.     Cerebral volume loss is present with proportional prominence of the sulci   and ventricles.     The visualized paranasal sinuses are clear.    The mastoid aircells and middle ear cavities are grossly clear.    The calvarium is intact.    CERVICAL SPINE:    There is no evidence for acute fracture, subluxation, or prevertebral   widening. The craniocervical junction is unremarkable.    There are multilevel degenerative changes characterized by disc   osteophyte complexes and facet and uncinate hypertrophy. This results in   mild multilevel spinal canal stenosis and multilevel neural foraminal   narrowing.     The lung apices are clear. The surrounding soft tissues are unremarkable.    IMPRESSION:    Head CT: No evidence for intracranial hemorrhage or displaced calvarial   fracture. Incidental note of a 7 mm hyperdense focus in the left felicity.   Differential includes cavernous malformation versus metastasis.   Contrast-enhanced brain MRI is recommended for further evaluation if   there are no prior studies for comparison.    Cervical spine CT: No evidence for acute displaced fracture or traumatic   malalignment. Cervical degenerative spondylosis, as described above. MRI   can be performed if there is concern for ligamentous or cord injury, and   if there are no MRI contraindications.    < end of copied text >    EKG reviewed: sinus rhythm HR 72, , , no ST elevation or depression, no T wave inversions LABS:                        14.7   6.8   )-----------( 196      ( 14 Oct 2018 19:45 )             43.8     Hgb Trend: 14.7<--  10-14    137  |  101  |  20  ----------------------------<  103<H>  4.3   |  23  |  1.68<H>    Ca    9.8      14 Oct 2018 19:45    TPro  7.1  /  Alb  4.4  /  TBili  0.4  /  DBili  x   /  AST  20  /  ALT  18  /  AlkPhos  75  10-14    Creatinine Trend: 1.68<--  PT/INR - ( 14 Oct 2018 19:45 )   PT: 10.7 sec;   INR: 0.98 ratio         PTT - ( 14 Oct 2018 19:45 )  PTT:32.3 sec  Urinalysis Basic - ( 14 Oct 2018 23:17 )    Color: Colorless / Appearance: Clear / S.008 / pH: x  Gluc: x / Ketone: Negative  / Bili: Negative / Urobili: Negative   Blood: x / Protein: Trace / Nitrite: Negative   Leuk Esterase: Negative / RBC: 1 /hpf / WBC 2 /hpf   Sq Epi: x / Non Sq Epi: 1 /hpf / Bacteria: Negative        Venous Blood Gas:  10-14 @ 23:17  --/--/--/--/--  VBG Lactate: 1.0  Venous Blood Gas:  10-14 @ 19:45  7.35/48/21/26/30  VBG Lactate: 2.5    < from: CT Head No Cont (10.14.18 @ 20:28) >    FINDINGS:    HEAD:    There is no CT evidence of acute intracranial hemorrhage, extra-axial   collection, midline shift, central herniation, hydrocephalus or acute   territorial infarct.     There is a 7 mm round hyperdense focus in the left aspect of the felicity   with suggestion of mild surrounding edema. No mass effect upon the   adjacent fourth ventricle.    Mild periventricular and subcortical white matter hypoattenuation without   mass effect is noted, non-specific, but likely related to chronic small   vessel ischemic changes.     Cerebral volume loss is present with proportional prominence of the sulci   and ventricles.     The visualized paranasal sinuses are clear.    The mastoid aircells and middle ear cavities are grossly clear.    The calvarium is intact.    CERVICAL SPINE:    There is no evidence for acute fracture, subluxation, or prevertebral   widening. The craniocervical junction is unremarkable.    There are multilevel degenerative changes characterized by disc   osteophyte complexes and facet and uncinate hypertrophy. This results in   mild multilevel spinal canal stenosis and multilevel neural foraminal   narrowing.     The lung apices are clear. The surrounding soft tissues are unremarkable.    IMPRESSION:    Head CT: No evidence for intracranial hemorrhage or displaced calvarial   fracture. Incidental note of a 7 mm hyperdense focus in the left felicity.   Differential includes cavernous malformation versus metastasis.   Contrast-enhanced brain MRI is recommended for further evaluation if   there are no prior studies for comparison.    Cervical spine CT: No evidence for acute displaced fracture or traumatic   malalignment. Cervical degenerative spondylosis, as described above. MRI   can be performed if there is concern for ligamentous or cord injury, and   if there are no MRI contraindications.    < end of copied text >    CXR reviewed: no pleural effusion bilaterally, no focal consolidation, no pneumothorax, no rib fractures    EKG reviewed: sinus rhythm HR 72, , , no ST elevation or depression, no T wave inversions Labs, imagings and EKG tracing personally reviewed    LABS:                        14.7   6.8   )-----------( 196      ( 14 Oct 2018 19:45 )             43.8     Hgb Trend: 14.7<--  10-14    137  |  101  |  20  ----------------------------<  103<H>  4.3   |  23  |  1.68<H>    Ca    9.8      14 Oct 2018 19:45    TPro  7.1  /  Alb  4.4  /  TBili  0.4  /  DBili  x   /  AST  20  /  ALT  18  /  AlkPhos  75  10-14    Creatinine Trend: 1.68<--  PT/INR - ( 14 Oct 2018 19:45 )   PT: 10.7 sec;   INR: 0.98 ratio         PTT - ( 14 Oct 2018 19:45 )  PTT:32.3 sec  Urinalysis Basic - ( 14 Oct 2018 23:17 )    Color: Colorless / Appearance: Clear / S.008 / pH: x  Gluc: x / Ketone: Negative  / Bili: Negative / Urobili: Negative   Blood: x / Protein: Trace / Nitrite: Negative   Leuk Esterase: Negative / RBC: 1 /hpf / WBC 2 /hpf   Sq Epi: x / Non Sq Epi: 1 /hpf / Bacteria: Negative        Venous Blood Gas:  10-14 @ 23:17  --/--/--/--/--  VBG Lactate: 1.0  Venous Blood Gas:  10-14 @ 19:45  7.35/48/21/26/30  VBG Lactate: 2.5    < from: CT Head No Cont (10.14.18 @ 20:28) >    FINDINGS:    HEAD:    There is no CT evidence of acute intracranial hemorrhage, extra-axial   collection, midline shift, central herniation, hydrocephalus or acute   territorial infarct.     There is a 7 mm round hyperdense focus in the left aspect of the felicity   with suggestion of mild surrounding edema. No mass effect upon the   adjacent fourth ventricle.    Mild periventricular and subcortical white matter hypoattenuation without   mass effect is noted, non-specific, but likely related to chronic small   vessel ischemic changes.     Cerebral volume loss is present with proportional prominence of the sulci   and ventricles.     The visualized paranasal sinuses are clear.    The mastoid aircells and middle ear cavities are grossly clear.    The calvarium is intact.    CERVICAL SPINE:    There is no evidence for acute fracture, subluxation, or prevertebral   widening. The craniocervical junction is unremarkable.    There are multilevel degenerative changes characterized by disc   osteophyte complexes and facet and uncinate hypertrophy. This results in   mild multilevel spinal canal stenosis and multilevel neural foraminal   narrowing.     The lung apices are clear. The surrounding soft tissues are unremarkable.    IMPRESSION:    Head CT: No evidence for intracranial hemorrhage or displaced calvarial   fracture. Incidental note of a 7 mm hyperdense focus in the left felicity.   Differential includes cavernous malformation versus metastasis.   Contrast-enhanced brain MRI is recommended for further evaluation if   there are no prior studies for comparison.    Cervical spine CT: No evidence for acute displaced fracture or traumatic   malalignment. Cervical degenerative spondylosis, as described above. MRI   can be performed if there is concern for ligamentous or cord injury, and   if there are no MRI contraindications.    < end of copied text >    CXR reviewed: no pleural effusion bilaterally, no focal consolidation, no pneumothorax, no rib fractures    EKG reviewed: sinus rhythm HR 72, , , no ST elevation or depression, no T wave inversions

## 2018-10-15 NOTE — DISCHARGE NOTE ADULT - PLAN OF CARE
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    No acute intracranial hemorrhage, mass effect, vasogenic edema, or   evidence of acute territorial infarct.    < from: MR Head w/wo IV Cont (10.15.18 @ 22:53) > cont taking prescribed meds  follow up with your PCP in [facility] continue BP medication as prescribed.  keep well hydrated  follow up with your PCP in [place] and neurosurgery

## 2018-10-15 NOTE — DISCHARGE NOTE ADULT - FINDINGS/TREATMENT
< from: CT Head No Cont (10.14.18 @ 20:28) >    Head CT: No evidence for intracranial hemorrhage or displaced calvarial   fracture. Incidental note of a 7 mm hyperdense focus in the left felicity.   Differential includes cavernous malformation versus metastasis.   Contrast-enhanced brain MRI is recommended for further evaluation if   there are no prior studies for comparison.    Cervical spine CT: No evidence for acute displaced fracture or traumatic   malalignment. Cervical degenerative spondylosis, as described above. MRI   can be performed if there is concern for ligamentous or cord injury, and   if there are no MRI contraindications.    < end of copied text > ECHO MRI < from: MR Head w/wo IV Cont (10.15.18 @ 22:53) >        < end of copied text >

## 2018-10-15 NOTE — H&P ADULT - PROBLEM SELECTOR PLAN 3
- Found to have 7 mm hyperdense focus at L felicity with mild edema, low probability of it contributing to syncope  - Differential include metastasis vs cavernous malformation  - MRI brain ordered for further evaluation - Found to have 7 mm hyperdense focus at L felicity with mild edema, unclear whether it causes this syncopal episode  - Differential include metastasis vs cavernous malformation  - MRI brain ordered for further evaluation

## 2018-10-15 NOTE — DISCHARGE NOTE ADULT - MEDICATION SUMMARY - MEDICATIONS TO STOP TAKING
I will STOP taking the medications listed below when I get home from the hospital:  None I will STOP taking the medications listed below when I get home from the hospital:    Fioricet oral tablet  -- 1 tab(s) by mouth , As Needed

## 2018-10-15 NOTE — DISCHARGE NOTE ADULT - HOSPITAL COURSE
Patient is a 73 year old man with history of HTN, osteoarthritis of back, BPH s/p TURP presented with syncope. He reports that he was trying to get up from bed to the go the bathroom this afternoon and fell. He had loss of consciousness for about 5 minutes before family found him and brought him to the ED. He did have urinary incontinence, no jerking movements, no tongue biting. Patient denies chest pain, or palpitations. He denies vertigo. He does endorse feeling week for the past 2-3 days. He has been having a nonproductive cough, nasal congestions, sore throat for the past 2-3 days. Denies sick contact, but him and his wife arrived from [place] via train, currently visiting their daughter. While trying to collect urine sample in the bathroom in the ED, patient had another witnessed episode of fall, stating that he tripped, no loss of consciousness this time and his wife was on his side to hold him. He denies abdominal pain, nausea or vomiting, diarrhea or constipation, does report feeling dehydrated. He denies past syncopal episodes. He does endorse subjective fever and chills. Patient is a 73 year old man with history of HTN, osteoarthritis of back, BPH s/p TURP presented with syncope. He reports that he was trying to get up from bed to the go the bathroom this afternoon and fell. He had loss of consciousness for about 5 minutes before family found him and brought him to the ED. He did have urinary incontinence, no jerking movements, no tongue biting. Patient denies chest pain, or palpitations. He denies vertigo. He does endorse feeling week for the past 2-3 days. He has been having a nonproductive cough, nasal congestions, sore throat for the past 2-3 days. Denies sick contact, but him and his wife arrived from [place] via train, currently visiting their daughter. While trying to collect urine sample in the bathroom in the ED, patient had another witnessed episode of fall, stating that he tripped, no loss of consciousness this time and his wife was on his side to hold him. He denies abdominal pain, nausea or vomiting, diarrhea or constipation, does report feeling dehydrated. He denies past syncopal episodes. He does endorse subjective fever and chills.   Blood cultures x 2 - no growth to date  Rapid RVP +   Rhinovirus +   IV hydration given and pt is feeling better      < from: CT Head No Cont (10.14.18 @ 20:28) >  Head CT: No evidence for intracranial hemorrhage or displaced calvarial   fracture. Incidental note of a 7 mm hyperdense focus in the left felicity.   Differential includes cavernous malformation versus metastasis.   Contrast-enhanced brain MRI is recommended for further evaluation if   there are no prior studies for comparison.    Cervical spine CT: No evidence for acute displaced fracture or traumatic   malalignment. Cervical degenerative spondylosis, as described above. MRI   can be performed if there is concern for ligamentous or cord injury, and   if there are no MRI contraindications.    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    No acute intracranial hemorrhage, mass effect, vasogenic edema, or   evidence of acute territorial infarct.    < end of copied text >    Evaluated by DR Cody Lord this morning and cleared for discharge to home. Patient to follow up with Neurosurgery upon arrival home to [place]. Patient is a 73 year old man with history of HTN, osteoarthritis of back, BPH s/p TURP presented with syncope. He reports that he was trying to get up from bed to the go the bathroom this afternoon and fell. He had loss of consciousness for about 5 minutes before family found him and brought him to the ED. He did have urinary incontinence, no jerking movements, no tongue biting. Patient denies chest pain, or palpitations. He denies vertigo. He does endorse feeling week for the past 2-3 days. He has been having a nonproductive cough, nasal congestions, sore throat for the past 2-3 days. Denies sick contact, but him and his wife arrived from [place] via train, currently visiting their daughter. While trying to collect urine sample in the bathroom in the ED, patient had another witnessed episode of fall, stating that he tripped, no loss of consciousness this time and his wife was on his side to hold him. He denies abdominal pain, nausea or vomiting, diarrhea or constipation, does report feeling dehydrated. He denies past syncopal episodes. He does endorse subjective fever and chills.     Blood cultures x 2 - no growth to date  Rapid RVP +   Rhinovirus +   IV hydration given and pt is feeling better today. VSS       < from: CT Head No Cont (10.14.18 @ 20:28) >  Head CT: No evidence for intracranial hemorrhage or displaced calvarial   fracture. Incidental note of a 7 mm hyperdense focus in the left felicity.   Differential includes cavernous malformation versus metastasis.   Contrast-enhanced brain MRI is recommended for further evaluation if   there are no prior studies for comparison.    Cervical spine CT: No evidence for acute displaced fracture or traumatic   malalignment. Cervical degenerative spondylosis, as described above. MRI   can be performed if there is concern for ligamentous or cord injury, and   if there are no MRI contraindications.    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    No acute intracranial hemorrhage, mass effect, vasogenic edema, or   evidence of acute territorial infarct.    < end of copied text >    Evaluated by DR Cody Lord this morning and cleared for discharge to home. Patient to follow up with Neurosurgery upon arrival home to [place]. Patient is a 73 year old man with history of HTN, osteoarthritis of back, BPH s/p TURP presented with syncope. He reports that he was trying to get up from bed to the go the bathroom this afternoon and fell. He had loss of consciousness for about 5 minutes before family found him and brought him to the ED. He did have urinary incontinence, no jerking movements, no tongue biting. Patient denies chest pain, or palpitations. He denies vertigo. He does endorse feeling week for the past 2-3 days. He has been having a nonproductive cough, nasal congestions, sore throat for the past 2-3 days. Denies sick contact, but him and his wife arrived from [place] via train, currently visiting their daughter. While trying to collect urine sample in the bathroom in the ED, patient had another witnessed episode of fall, stating that he tripped, no loss of consciousness this time and his wife was on his side to hold him. He denies abdominal pain, nausea or vomiting, diarrhea or constipation, does report feeling dehydrated. He denies past syncopal episodes. He does endorse subjective fever and chills.   Troponin negative   Blood cultures x 2 - no growth to date  Rapid RVP +   Rhinovirus +   IV hydration given overnight  and pt is feeling better today. VSS Ambulating without complaint      < from: CT Head No Cont (10.14.18 @ 20:28) >  Head CT: No evidence for intracranial hemorrhage or displaced calvarial   fracture. Incidental note of a 7 mm hyperdense focus in the left felicity.   Differential includes cavernous malformation versus metastasis.   Contrast-enhanced brain MRI is recommended for further evaluation if   there are no prior studies for comparison.    Cervical spine CT: No evidence for acute displaced fracture or traumatic   malalignment. Cervical degenerative spondylosis, as described above. MRI   can be performed if there is concern for ligamentous or cord injury, and   if there are no MRI contraindications.    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    No acute intracranial hemorrhage, mass effect, vasogenic edema, or   evidence of acute territorial infarct.    < end of copied text >    Evaluated by DR Cody Lord this morning and cleared for discharge to home. Patient to follow up with Neurosurgery upon arrival home to [place]. Mr. Prabhakar is an entirely independent 74 yo man from [facility] w/ hx HTN, OA of back, BPH s/p TURP presented with syncope, URI and fever admitted for syncope evaluation, dehydration and ++ entero/rhinovirus infection.     Syncope occurred w/ virus c/b decreased PO intake x many days, +alcohol and fiorocet w/ cumulative diuretic effect, and in context of laying down to standing up per patient report: this is entirely c/w dehydration + orthostatic hypotension in setting of illness. No CP, non ischemic ekg, neg trop on admission w/ no hx of preceding palpitaitons and no cardiac hx, w/ normal EKG, and no murmur or clinical e/o HF: there is no indication for inpatient echo at this time per ACC/AHA 2017 guidelines on evaluation of patients w/ syncope.     No events on tele x 24 hrs, asymptomatic, improved w/ hydration. Incidentally discovered 7mm cavernoma in felicity reviewed with neurosurgery, no indication for acute intervention. Requires outpatient f/u w/ neurosurg. Seen by PT, no needs. D/c home to [facility] w/ outpatient neurosurg f/u for cavernoma and PCP f/u for syncope.

## 2018-10-15 NOTE — DISCHARGE NOTE ADULT - CARE PLAN
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    No acute intracranial hemorrhage, mass effect, vasogenic edema, or   evidence of acute territorial infarct.    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    No acute intracranial hemorrhage, mass effect, vasogenic edema, or   evidence of acute territorial infarct.    < from: MR Head w/wo IV Cont (10.15.18 @ 22:53) > Principal Discharge DX:	Syncope  Goal:	will no longer experience syncope  Assessment and plan of treatment:	continue BP medication as prescribed.  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    No acute intracranial hemorrhage, mass effect, vasogenic edema, or   evidence of acute territorial infarct.    < from: MR Head w/wo IV Cont (10.15.18 @ 22:53) >

## 2018-10-15 NOTE — DISCHARGE NOTE ADULT - NS AS ACTIVITY OBS
No Heavy lifting/straining No Heavy lifting/straining/Walking-Indoors allowed/Showering allowed/Walking-Outdoors allowed

## 2018-10-15 NOTE — H&P ADULT - PROBLEM SELECTOR PLAN 8
- DVT prophylaxis: IMPROVE score 1 (age>60), Lovenox  - GI prophylaxis: not indicated  - Diet: NICO Booker PGY-2  Internal medicine night admit  Pager [phone] - Patient taking Tramadol 50 mg Q4H for chronic back pain  - ISTOP #: [id number]  - Continue with Tramadol 50 mg Q6H for severe pain

## 2018-10-15 NOTE — H&P ADULT - PROBLEM SELECTOR PLAN 5
s/p TURP  - UA negative for UTI  - Continue to monitor urinary symptoms BP elevated at 150-160/70-90  - Patient does not remember his home medications   - Continue with Amlodipine 10 mg with holding parameters for now - Cr elevated at 1.68 but unknown baseline, unclear whether there is a CKD component  - Could have NOAH in the setting of dehydration   - Continue to monitor BMP after IVF for hydration, monitor UOP as patient does have history of BPH, avoid nephrotoxic agents  - Clarify baseline Cr with patient's PCP

## 2018-10-16 VITALS
OXYGEN SATURATION: 98 % | SYSTOLIC BLOOD PRESSURE: 143 MMHG | DIASTOLIC BLOOD PRESSURE: 79 MMHG | HEART RATE: 72 BPM | RESPIRATION RATE: 18 BRPM | TEMPERATURE: 99 F

## 2018-10-16 LAB
ANION GAP SERPL CALC-SCNC: 13 MMOL/L — SIGNIFICANT CHANGE UP (ref 5–17)
BUN SERPL-MCNC: 19 MG/DL — SIGNIFICANT CHANGE UP (ref 7–23)
CALCIUM SERPL-MCNC: 9.3 MG/DL — SIGNIFICANT CHANGE UP (ref 8.4–10.5)
CHLORIDE SERPL-SCNC: 109 MMOL/L — HIGH (ref 96–108)
CO2 SERPL-SCNC: 16 MMOL/L — LOW (ref 22–31)
CREAT SERPL-MCNC: 1.54 MG/DL — HIGH (ref 0.5–1.3)
GLUCOSE SERPL-MCNC: 96 MG/DL — SIGNIFICANT CHANGE UP (ref 70–99)
HCT VFR BLD CALC: 41.2 % — SIGNIFICANT CHANGE UP (ref 39–50)
HGB BLD-MCNC: 13.9 G/DL — SIGNIFICANT CHANGE UP (ref 13–17)
MCHC RBC-ENTMCNC: 31.7 PG — SIGNIFICANT CHANGE UP (ref 27–34)
MCHC RBC-ENTMCNC: 33.9 GM/DL — SIGNIFICANT CHANGE UP (ref 32–36)
MCV RBC AUTO: 93.7 FL — SIGNIFICANT CHANGE UP (ref 80–100)
PLATELET # BLD AUTO: 176 K/UL — SIGNIFICANT CHANGE UP (ref 150–400)
POTASSIUM SERPL-MCNC: 4.7 MMOL/L — SIGNIFICANT CHANGE UP (ref 3.5–5.3)
POTASSIUM SERPL-SCNC: 4.7 MMOL/L — SIGNIFICANT CHANGE UP (ref 3.5–5.3)
RBC # BLD: 4.4 M/UL — SIGNIFICANT CHANGE UP (ref 4.2–5.8)
RBC # FLD: 12.2 % — SIGNIFICANT CHANGE UP (ref 10.3–14.5)
SODIUM SERPL-SCNC: 138 MMOL/L — SIGNIFICANT CHANGE UP (ref 135–145)
WBC # BLD: 5.9 K/UL — SIGNIFICANT CHANGE UP (ref 3.8–10.5)
WBC # FLD AUTO: 5.9 K/UL — SIGNIFICANT CHANGE UP (ref 3.8–10.5)

## 2018-10-16 PROCEDURE — 99239 HOSP IP/OBS DSCHRG MGMT >30: CPT

## 2018-10-16 RX ADMIN — AMLODIPINE BESYLATE 10 MILLIGRAM(S): 2.5 TABLET ORAL at 05:07

## 2018-10-16 NOTE — PROGRESS NOTE ADULT - PROBLEM SELECTOR PLAN 8
- Patient taking Tramadol 50 mg Q4H for chronic back pain  - ISTOP #: [id number]  - Continue with Tramadol 50 mg Q6H for severe pain, GFR > 30 no dosing changes necessary.

## 2018-10-16 NOTE — PROGRESS NOTE ADULT - PROBLEM SELECTOR PLAN 1
- dehydration + orthostasis as above given hx.  - hydration, already feels improved.   - orthostatics are negative.   - ambulate with nurse around unit.   - no skilled pt needs.

## 2018-10-16 NOTE — PROGRESS NOTE ADULT - SUBJECTIVE AND OBJECTIVE BOX
cc: syncope    Overnight no acute events, rested comfortably.   Feels improved w/ hydration. Headache improved.   Repeat orthostatics are negative.   Tele 60-70 NSR.     REVIEW OF SYSTEMS:  CONSTITUTIONAL: +recent viral prodrome, + fatigue, residual cough.   EYES: No eye pain, visual disturbances, or discharge  ENMT:  No difficulty hearing, tinnitus, vertigo; No sinus or throat pain  NECK: +chronic neck pain   RESPIRATORY: No cough, wheezing, chills or hemoptysis; No shortness of breath  CARDIOVASCULAR: No chest pain, palpitations, dizziness, or leg swelling.  GASTROINTESTINAL: No abdominal or epigastric pain. No nausea, vomiting, or hematemesis; No diarrhea or constipation. No melena or hematochezia.  GENITOURINARY: No dysuria, frequency, hematuria, or incontinence  NEUROLOGICAL: No headaches, memory loss, loss of strength, numbness, or tremors  SKIN: No itching, burning, rashes, or lesions   ENDOCRINE: No heat or cold intolerance; No hair loss  MUSCULOSKELETAL: No joint pain or swelling; No muscle, back, or extremity pain  PSYCHIATRIC: No depression, anxiety, mood swings, or difficulty sleeping  HEME/LYMPH: No easy bruising, or bleeding gums    Vital Signs Last 24 Hrs  T(C): 37.1 (16 Oct 2018 14:18), Max: 37.1 (15 Oct 2018 17:20)  T(F): 98.8 (16 Oct 2018 14:18), Max: 98.8 (16 Oct 2018 14:18)  HR: 72 (16 Oct 2018 14:18) (63 - 72)  BP: 143/79 (16 Oct 2018 14:18) (120/75 - 148/79)  BP(mean): --  RR: 18 (16 Oct 2018 14:18) (16 - 18)  SpO2: 98% (16 Oct 2018 14:18) (98% - 98%)    PHYSICAL EXAM:  GENERAL: NAD, well-groomed. Glasses on. Wife by bedside again. He feels entirely well, asymptomatic.   HEAD:  Atraumatic, Normocephalic  EYES: EOMI, conjunctiva and sclera clear  NECK: Supple, No JVD, + paracervical muscle spasm on L. No carotid bruits.   NERVOUS SYSTEM:  Alert & Oriented X3, Good concentration; Motor Strength 5/5 B/L upper and lower extremities  CHEST/LUNG: Clear to percussion bilaterally; No rales, rhonchi, wheezing  HEART: Regular rate and rhythm; NO murmur heard.   ABDOMEN: Soft, Nontender, Nondistended; Bowel sounds present  EXTREMITIES:  2+ Peripheral Pulses, No clubbing, cyanosis, or edema  SKIN: No rashes or lesions    Consultant(s) Notes Reviewed:  [x ] YES  [ ] NO    LABS:  10/16: no leukocytosis, resolved anemia, bmp wnl other than hyperchloremia in setting of fluids. Cr improved to 1.5.         no leukoctosis, hgb own 12 from 14, all cell lines down after fluids w/ no clinical bleeding.   Cr 1.68                        12.6   5.28  )-----------( 167      ( 15 Oct 2018 07:46 )             38.2     10-15    139  |  106  |  15  ----------------------------<  108<H>  4.0   |  23  |  1.68<H>    Ca    9.0      15 Oct 2018 06:37  Phos  2.4     10-15  Mg     2.1     10-15    TPro  7.1  /  Alb  4.4  /  TBili  0.4  /  DBili  x   /  AST  20  /  ALT  18  /  AlkPhos  75  10-14    PT/INR - ( 14 Oct 2018 19:45 )   PT: 10.7 sec;   INR: 0.98 ratio         PTT - ( 14 Oct 2018 19:45 )  PTT:32.3 sec  Urinalysis Basic - ( 14 Oct 2018 23:17 )    Color: Colorless / Appearance: Clear / S.008 / pH: x  Gluc: x / Ketone: Negative  / Bili: Negative / Urobili: Negative   Blood: x / Protein: Trace / Nitrite: Negative   Leuk Esterase: Negative / RBC: 1 /hpf / WBC 2 /hpf   Sq Epi: x / Non Sq Epi: 1 /hpf / Bacteria: Negative    CAPILLARY BLOOD GLUCOSE    POCT Blood Glucose.: 108 mg/dL (14 Oct 2018 18:35)    RADIOLOGY & ADDITIONAL TESTS:  Imaging Personally Reviewed: yes  Care discussed w/ other providers: yes--  Consultant notes reviewed: yes    CTH:     HEAD:    There is no CT evidence of acute intracranial hemorrhage, extra-axial   collection, midline shift, central herniation, hydrocephalus or acute   territorial infarct.     There is a 7 mm round hyperdense focus in the left aspect of the felicity   with suggestion of mild surrounding edema. No mass effect upon the   adjacent fourth ventricle.    Mild periventricular and subcortical white matter hypoattenuation without   mass effect is noted, non-specific, but likely related to chronic small   vessel ischemic changes.     Cerebral volume loss is present with proportional prominence of the sulci   and ventricles.     The visualized paranasal sinuses are clear.    The mastoid air cells and middle ear cavities are grossly clear.    The calvarium is intact.    CERVICAL SPINE:    There is no evidence for acute fracture, subluxation, or prevertebral   widening. The craniocervical junction is unremarkable.    There are multilevel degenerative changes characterized by disc   osteophyte complexes and facet and uncinate hypertrophy. This results in   mild multilevel spinal canal stenosis and multilevel neural foraminal   narrowing.     MRI head    There is an 8 x 7 mm focus of heterogeneous signal abnormality in the   left posterolateral basis pontis. The lesion demonstrates heterogeneous   enhancement as well as susceptibility artifact. No surrounding vasogenic   edema. Findings are most consistent with a cavernoma.    No hydrocephalus,, midline shift, mass effect, acute intracranial   hemorrhage, or acute infarction. Moderate white matter microvascular   ischemic disease. Signal voids are seen within the major intracranial   vessels consistent with their patency.    Mild ethmoid sinus mucosal thickening. Remaining visualized paranasal   sinuses and mastoid air cells clear. The orbits, sellar and suprasellar   structures, and craniocervical junction are unremarkable.    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    No acute intracranial hemorrhage, mass effect, vasogenic edema, or   evidence of acute territorial infarct.        The lung apices are clear. The surrounding soft tissues are unremarkable.    IMPRESSION:    Head CT: No evidence for intracranial hemorrhage or displaced calvarial   fracture. Incidental note of a 7 mm hyperdense focus in the left felicity.   Differential includes cavernous malformation versus metastasis.   Contrast-enhanced brain MRI is recommended for further evaluation if   there are no prior studies for comparison.    Cervical spine CT: No evidence for acute displaced fracture or traumatic   malalignment. Cervical degenerative spondylosis, as described above. MRI   can be performed if there is concern for ligamentous or cord injury, and   if there are no MRI contraindications.

## 2018-10-16 NOTE — PROGRESS NOTE ADULT - ASSESSMENT
Mr. Prabhakar is an entirely independent 74 yo man from [facility] w/ hx HTN, OA of back, BPH s/p TURP presented with syncope, URI and fever admitted for syncope evaluation, dehydration and entero/rhinovirus infection.     Syncope occurred w/ virus c/b decreased PO intake x many days, +alcohol and fiorocet w/ cumulative diuretic effect, and in context of laying down to standing up per patient report: this is entirely c/w dehydration + orthostatic hypotension in setting of illness. No CP, non ischemic ekg, neg trop on admission w/ no hx of preceding palpitaitons and no cardiac hx, no murmur or clinical e/o HF: there is no indication for inpatient echo at this time per ACC/AHA 2017 guidelines on evaluation of patients w/ syncope.     No events on tele x 24 hrs, asymptomatic, improved w/ hydration. Incidentally discovered 7mm cavernoma in felicity reviewed with neurosurgery, no indication for acute intervention. Requires outpatient f/u w/ neurosurg. Seen by PT, no needs. D/c home w/ outpatient f/u for cavernoma and PCP f/u for syncope.

## 2018-10-16 NOTE — CHART NOTE - NSCHARTNOTEFT_GEN_A_CORE
MR reviewed. Cavernoma without symptoms. May fu outpatient in 1-2 weeks with neurosurgery after discharge.

## 2018-10-20 LAB
CULTURE RESULTS: SIGNIFICANT CHANGE UP
CULTURE RESULTS: SIGNIFICANT CHANGE UP
SPECIMEN SOURCE: SIGNIFICANT CHANGE UP
SPECIMEN SOURCE: SIGNIFICANT CHANGE UP

## 2018-11-11 PROCEDURE — 87633 RESP VIRUS 12-25 TARGETS: CPT

## 2018-11-11 PROCEDURE — 71046 X-RAY EXAM CHEST 2 VIEWS: CPT

## 2018-11-11 PROCEDURE — 81001 URINALYSIS AUTO W/SCOPE: CPT

## 2018-11-11 PROCEDURE — 84132 ASSAY OF SERUM POTASSIUM: CPT

## 2018-11-11 PROCEDURE — 96374 THER/PROPH/DIAG INJ IV PUSH: CPT

## 2018-11-11 PROCEDURE — 83735 ASSAY OF MAGNESIUM: CPT

## 2018-11-11 PROCEDURE — 84295 ASSAY OF SERUM SODIUM: CPT

## 2018-11-11 PROCEDURE — 80048 BASIC METABOLIC PNL TOTAL CA: CPT

## 2018-11-11 PROCEDURE — 83605 ASSAY OF LACTIC ACID: CPT

## 2018-11-11 PROCEDURE — 87581 M.PNEUMON DNA AMP PROBE: CPT

## 2018-11-11 PROCEDURE — 82962 GLUCOSE BLOOD TEST: CPT

## 2018-11-11 PROCEDURE — 82330 ASSAY OF CALCIUM: CPT

## 2018-11-11 PROCEDURE — 87040 BLOOD CULTURE FOR BACTERIA: CPT

## 2018-11-11 PROCEDURE — 87486 CHLMYD PNEUM DNA AMP PROBE: CPT

## 2018-11-11 PROCEDURE — 96361 HYDRATE IV INFUSION ADD-ON: CPT

## 2018-11-11 PROCEDURE — 85610 PROTHROMBIN TIME: CPT

## 2018-11-11 PROCEDURE — 82803 BLOOD GASES ANY COMBINATION: CPT

## 2018-11-11 PROCEDURE — 99285 EMERGENCY DEPT VISIT HI MDM: CPT | Mod: 25

## 2018-11-11 PROCEDURE — 80053 COMPREHEN METABOLIC PANEL: CPT

## 2018-11-11 PROCEDURE — 85730 THROMBOPLASTIN TIME PARTIAL: CPT

## 2018-11-11 PROCEDURE — 85027 COMPLETE CBC AUTOMATED: CPT

## 2018-11-11 PROCEDURE — 93005 ELECTROCARDIOGRAM TRACING: CPT

## 2018-11-11 PROCEDURE — 97161 PT EVAL LOW COMPLEX 20 MIN: CPT

## 2018-11-11 PROCEDURE — A9585: CPT

## 2018-11-11 PROCEDURE — 84100 ASSAY OF PHOSPHORUS: CPT

## 2018-11-11 PROCEDURE — 87798 DETECT AGENT NOS DNA AMP: CPT

## 2018-11-11 PROCEDURE — 72125 CT NECK SPINE W/O DYE: CPT

## 2018-11-11 PROCEDURE — 70553 MRI BRAIN STEM W/O & W/DYE: CPT

## 2018-11-11 PROCEDURE — 70450 CT HEAD/BRAIN W/O DYE: CPT

## 2018-11-11 PROCEDURE — 84484 ASSAY OF TROPONIN QUANT: CPT

## 2018-11-11 PROCEDURE — 82947 ASSAY GLUCOSE BLOOD QUANT: CPT

## 2018-11-11 PROCEDURE — 82435 ASSAY OF BLOOD CHLORIDE: CPT

## 2018-11-11 PROCEDURE — 85014 HEMATOCRIT: CPT

## 2019-04-03 NOTE — ED ADULT NURSE NOTE - NSSISCREENINGQ2_ED_A_ED
[facility]  Pediatric Cardiology  Progress Note    Patient Name: LAURA Membreno  MRN: [id number]  Admission Date: 3/21/2019  Hospital Length of Stay: 13 days  Code Status: Full Code   Attending Physician: Ying Varela MD   Primary Care Physician: Stas Tang Jr, MD  Expected Discharge Date: 4/2/2019  Principal Problem:Heart failure    Subjective:     Interval History: No acute concerns overnight. Weight up a little again overnight.     Objective:     Vital Signs (Most Recent):  Temp: 97.7 °F (36.5 °C) (04/03/19 0730)  Pulse: 169 (04/03/19 1108)  Resp: 40 (04/03/19 1030)  BP: (!) 82/33 (04/03/19 0730)  SpO2: 94 % (04/03/19 1108) Vital Signs (24h Range):  Temp:  [97.7 °F (36.5 °C)-98.6 °F (37 °C)] 97.7 °F (36.5 °C)  Pulse:  [112-177] 169  Resp:  [40-75] 40  SpO2:  [92 %-100 %] 94 %  BP: (77-87)/(33-64) 82/33     Weight: 3.29 kg (7 lb 4.1 oz)  Body mass index is 13.54 kg/m².     SpO2: 94 %  O2 Device (Oxygen Therapy): room air    Intake/Output - Last 3 Shifts       04/01 0700 - 04/02 0659 04/02 0700 - 04/03 0659 04/03 0700 - 04/04 0659    NG/ 423 130    Total Intake(mL/kg) 465 (141.8) 423 (128.6) 130 (39.5)    Urine (mL/kg/hr) 64 (0.8) 62 (0.8)     Other 273 117 72    Total Output 337 179 72    Net +128 +244 +58                 Lines/Drains/Airways     Drain                 Gastrostomy/Enterostomy 02/12/19 1546 Gastrostomy tube w/ balloon feeding 49 days                Scheduled Medications:    captopril  0.3 mg Oral TID    furosemide  4 mg Per G Tube Q8H    furosemide  4 mg Intravenous Q8H    ranitidine hcl  4 mg/kg/day Per G Tube Q12H       Continuous Medications:       PRN Medications:     Physical Exam  Constitutional: Small infant male. Awake and appears comfortable. Features consistent with Trisomy 21.   HENT:  Head: Anterior fontanelle soft and flat   Nose: Nose normal.   Mouth/Throat: Mucous membranes are moist.   Eyes: Conjunctivae normal.   Neck: Neck supple.    Cardiovascular: Normal rate, regular rhythm, S1 normal and S2 normal.  2+ peripheral pulses. 3/6 harsh holosystolic murmur at the left sternal border. Intermittent gallop.   Pulmonary/Chest: Mild subcostal retractions and intermittent tachypnea. Mildly coarse breath sounds bilaterally from upper airway noise.   Abdominal: Soft. Bowel sounds are normal.  No distension. No spenomegaly. Liver down 2 cm below RCM. G-tube site without erythema or drainage  Musculoskeletal: No edema.   Lymphadenopathy: No cervical adenopathy.   Neurological: Exhibits abnormal muscle tone, hypotonic.   Skin: Skin is warm and dry. Capillary refill takes less than 2 seconds. Turgor is turgor normal. No cyanosis.    Significant Labs:     No new labs.     Significant Imaging:     CXR overall unchanged with cardiomegaly and mild edema.         Assessment and Plan:     Cardiac/Vascular  * Heart failure  Basilio is a 3 m.o. male with:  1. Large perimembranous ventricular septal defect  - left heart dilation  - pulmonary overcirculation on diuretics and afterload reduction  2. Patent foramen ovale  3. Patent ductus arteriosus  4. Trisomy 21  5. Failure to thrive  6. Poor feeding  - s/p Nissen and Gtube (2/12/19)  7. Laryngomalacia  - s/p supraglottoplasty (2/12/19)  8. Failure to thrive      Neuro:  - No acute concerns  Respiratory:  - CXR stable. Repeat as needed.   - Will have ENT follow up in aerodigestive Friday.   CV:  - Continue captopril 0.3 mg q8.  - Continue lasix 4 mg but change to IV q8. Time for next dose directly after blood transfusion.   FEN/GI:  - Neosure 28 kcal/oz: Bolus of 65 ml q 3 x4 continuous feeds at 25 ml/hr. MCT oil 3 TID.  - Nutrition consulted for parental education given concerns of inappropriate formula mixing - teaching going well.   - Continue Zantac per ENT.   - BMP stable. Repeat today.   - Surgery has addressed tissue around GT site with silver nitrate.   Renal:  - Continue diuresis  Heme/ID:  - CBC repeat today.    - Will transfuse with pRBC's today.   - No infectious concerns  - Will swab for MRSA Friday  Social:   - Will continue to work with parents on education and whatever resources will help them care for him. DCFS case open - so far ok to return home.   Dispo:   - Surgery next Tuesday.   - Patient has Aerodigestive follow up on Friday  - Social work working on Private duty nursing for help with home care.                     RUBEN Beach  Pediatric Cardiology  [facility]     No

## 2022-10-13 NOTE — PATIENT PROFILE ADULT - NSPROCHRONICPAIN_GEN_A_NUR
Medicare Wellness Visit  Plan for Preventive Care    A good way for you to stay healthy is to use preventive care.  Medicare covers many services that can help you stay healthy.* The goal of these services is to find any health problems as quickly as possible. Finding problems early can help make them easier to treat.  Your personal plan below lists the services you may need and when they are due.      Health Maintenance Summary       Hepatitis B Vaccine (1 of 3 - 3-dose series)  Overdue - never done    DTaP/Tdap/Td Vaccine (1 - Tdap)  Overdue - never done    Lung Cancer Screening (Yearly)  Overdue - never done    Diabetes Eye Exam (Yearly)  Overdue since 6/22/2022    DM/CKD GFR (Yearly)  Ordered on 10/13/2022    Diabetes Foot Exam (Yearly)  Next due on 4/4/2023    Diabetes A1C (Every 6 Months)  Next due on 4/13/2023    Depression Screening (Yearly)  Next due on 10/6/2023    Traditional Medicare- Medicare Wellness Visit (Yearly)  Next due on 10/13/2023    Colorectal Cancer Risk - Colonoscopy (Every 3 Years)  Next due on 7/26/2025    Shingles Vaccine   Completed    Pneumococcal Vaccine 65+   Completed    Hepatitis C Screening   Completed    Influenza Vaccine   Completed    COVID-19 Vaccine   Completed    Meningococcal Vaccine   Aged Out    HPV Vaccine   Aged Out             Preventive Care for Women and Men    Heart Screenings (Cardiovascular):  Blood tests are used to check your cholesterol, lipid and triglyceride levels. High levels can increase your risk for heart disease and stroke. High levels can be treated with medications, diet and exercise. Lowering your levels can help keep your heart and blood vessels healthy.  Your provider will order these tests if they are needed.    An ultrasound is done to see if you have an abdominal aortic aneurysm (AAA).  This is an enlargement of one of the main blood vessels that delivers blood to the body.   In the [place], 9,000 deaths are caused by AAA.  You may not  even know you have this problem and as many as 1 in 3 people will have a serious problem if it is not treated.  Early diagnosis allows for more effective treatment and cure.  If you have a family history of AAA or are a male age 65-75 who has smoked, you are at higher risk of an AAA.  Your provider can order this test, if needed.    Colorectal Screening:  There are many tests that are used to check for cancer of your colon and rectum. You and your provider should discuss what test is best for you and when to have it done.  Options include:  Screening Colonoscopy: exam of the entire colon, seen through a flexible lighted tube.  Flexible Sigmoidoscopy: exam of the last third (sigmoid portion) of the colon and rectum, seen through a flexible lighted tube.  Cologuard DNA stool test: a sample of your stool is used to screen for cancer and unseen blood in your stool.  Fecal Occult Blood Test: a sample of your stool is studied to find any unseen blood    Flu Shot:  An immunization that helps to prevent influenza (the flu). You should get this every year. The best time to get the shot is in the fall.    Pneumococcal Shot:  Vaccines help prevent pneumococcal disease, which is any type of illness caused by Streptococcus pneumoniae bacteria. There are two kinds of pneumococcal vaccines available in the [place]:   Pneumococcal conjugate vaccines (PCV20 or Ofgcqqx25®)  Pneumococcal polysaccharide vaccine (PPSV23 or Qlptxenyz56®)  For those who have never received any pneumococcal conjugate vaccine, CDC recommends PVC20 for adults 65 years or older and adults 19 through 64 years old with certain medical conditions or risk factors.   For those who have previously received PCV13, this should be followed by a dose of PPSV23.     Hepatitis B Shot:  An immunization that helps to protect people from getting Hepatitis B. Hepatitis B is a virus that spreads through contact with infected blood or body fluids. Many people with the  virus do not have symptoms.  The virus can lead to serious problems, such as liver disease. Some people are at higher risk than others. Your doctor will tell you if you need this shot.     Diabetes Screening:  A test to measure sugar (glucose) in your blood is called a fasting blood sugar. Fasting means you cannot have food or drink for at least 8 hours before the test. This test can detect diabetes long before you may notice symptoms.    Glaucoma Screening:  Glaucoma screening is performed by your eye doctor. The test measures the fluid pressure inside your eyes to determine if you have glaucoma.     Hepatitis C Screening:  A blood test to see if you have the hepatitis C virus.  Hepatitis C attacks the liver and is a major cause of chronic liver disease.  Medicare will cover a single screening for all adults born between 1945 & 1965, or high risk patients (people who have injected illegal drugs or people who have had blood transfusions).  High risk patients who continue to inject illegal drugs can be screened for Hepatitis C every year.    Smoking and Tobacco-Use Cessation Counseling:  Tobacco is the single greatest cause of disease and early death in our country today. Medication and counseling together can increase a person’s chance of quitting for good.   Medicare covers two quitting attempts per year, with four counseling sessions per attempt (eight sessions in a 12 month period)    Preventive Screening tests for Women    Screening Mammograms and Breast Exams:  An x-ray of your breasts to check for breast cancer before you or your doctor may be able to feel it.  If breast cancer is found early it can usually be treated with success.    Pelvic Exams and Pap Tests:  An exam to check for cervical and vaginal cancer. A Pap test is a lab test in which cells are taken from your cervix and sent to the lab to look for signs of cervical cancer. If cancer of the cervix is found early, chances for a cure are good. Testing  can generally end at age 65, or if a woman has a hysterectomy for a benign condition. Your provider may recommend more frequent testing if certain abnormal results are found.    Bone Mass Measurements:  A painless x-ray of your bone density to see if you are at risk for a broken bone. Bone density refers to the thickness of bones or how tightly the bone tissue is packed.    Preventive Screening tests for Men    Prostate Screening:  Should you have a prostate cancer test (PSA)?  It is up to you to decide if you want a prostate cancer test. Talk to your clinician to find out if the test is right for you.  Things for you to consider and talk about should include:  Benefits and harms of the test  Your family history  How your race/ethnicity may influence the test  If the test may impact other medical conditions you have  Your values on screenings and treatments    *Medicare pays for many preventive services to keep you healthy. For some of these services, you might have to pay a deductible, coinsurance, and / or copayment.  The amounts vary depending on the type of services you need and the kind of Medicare health plan you have.    For further details on screenings offered by Medicare please visit: https://www.medicare.gov/coverage/preventive-screening-services    yes

## 2024-09-19 NOTE — PHYSICAL THERAPY INITIAL EVALUATION ADULT - RANGE OF MOTION EXAMINATION, REHAB EVAL
bilateral lower extremity ROM was WFL (within functional limits)/bilateral upper extremity ROM was WFL (within functional limits)
Vaccine Information Sheet (VIS) provided-VIS date: 8/6/21

## 2024-10-11 NOTE — PATIENT PROFILE ADULT - BRADEN MOISTURE
[facility]  Precepting Note    Subjective:  Annual Physical  Left ear pain- taking OTC meds  Outside ear helix  Pap smear 5 years ago    ROS otherwise negative    Past medical, surgical, family and social history were reviewed, non-contributory, and unchanged unless otherwise stated.    Objective:    /79   Pulse 64   Temp 97.4 °F (36.3 °C) (Temporal)   Resp 16   Ht 1.702 m (5' 7.01\")   Wt 67.1 kg (148 lb)   LMP 12/20/2016 (Approximate)   SpO2 98%   BMI 23.17 kg/m²     Exam is as noted by resident with the following changes, additions or corrections:    General:  NAD; alert & oriented x 3   Heart:  RRR, no murmurs, gallops, or rubs.  Lungs:  CTA bilaterally, no wheeze, rales or rhonchi  Abd: bowel sounds present, nontender, nondistended, no masses  Extrem:  No clubbing, cyanosis, or edema    Assessment/Plan:    Annual physical:   Left ear pain: external ear canal- likely dermatitis. Start topical steroids  Labs as ordered  HM: update     Attending Physician Statement  I have reviewed the chart, including any radiology or labs, and have seen the patient with the resident(s).  I personally reviewed and performed key elements of the history and exam.  I agree with the assessment, plan and orders as documented by the resident.  Please refer to the resident note for additional information.      Electronically signed by NANETTE DUNBAR MD on 10/11/2024 at 9:44 AM   
will check labs.  - Hemoglobin A1C; Future    3. Elevated cholesterol  Check lab  - Lipid Panel; Future    4. Otalgia of left ear  Patient is having pain on the left ear, on examination patient did have a dermatitis rash present on the outer hallux on the left air, prescribing patient Kenalog cream to be used topically.  Patient did have impacted cerumen bilaterally patient refused to be cleaned.    5. Dermatitis  - triamcinolone (KENALOG) 0.1 % cream; Apply topically 2 times daily on left outer ear  Dispense: 45 g; Refill: 0      Counseled regarding above diagnosis, including possible risks and complications, especially if left uncontrolled. Counseled regarding the possible side effects, risks, benefits and alternatives to treatment; patient and/or guardian verbalizes understanding, agrees, feels comfortable with and wishes to proceed with above treatment plan.    Call or go to ED immediately if symptoms worsen or persist. Advised patient to call with any new medication issues, and, as applicable, read all Rx info from pharmacy to assure aware of all possible risks and side effects of medication before taking.     Patient and/or guardian given opportunity to ask questions/raise concerns.The patient verbalized comfort and understanding of instructions.    I encourage further reading and education about your health conditions.Information on many health conditions is provided by the [employer] Academy [employer]: https://familydoctor.org/  Please bring any questions to me at your next visit.    Medication List:    Current Outpatient Medications   Medication Sig Dispense Refill    triamcinolone (KENALOG) 0.1 % cream Apply topically 2 times daily on left outer ear 45 g 0    naproxen (NAPROSYN) 250 MG tablet Take 2 tablets by mouth 2 times daily (with meals) (Patient not taking: Reported on 9/29/2023) 60 tablet 2     No current facility-administered medications for this visit.      Return to Office: Return in 
(4) rarely moist